# Patient Record
Sex: FEMALE | Race: BLACK OR AFRICAN AMERICAN | NOT HISPANIC OR LATINO
[De-identification: names, ages, dates, MRNs, and addresses within clinical notes are randomized per-mention and may not be internally consistent; named-entity substitution may affect disease eponyms.]

---

## 2024-03-06 ENCOUNTER — NON-APPOINTMENT (OUTPATIENT)
Age: 29
End: 2024-03-06

## 2024-03-14 ENCOUNTER — APPOINTMENT (OUTPATIENT)
Dept: OTOLARYNGOLOGY | Facility: CLINIC | Age: 29
End: 2024-03-14
Payer: COMMERCIAL

## 2024-03-14 DIAGNOSIS — R04.0 EPISTAXIS: ICD-10-CM

## 2024-03-14 DIAGNOSIS — J02.9 ACUTE PHARYNGITIS, UNSPECIFIED: ICD-10-CM

## 2024-03-14 DIAGNOSIS — J30.2 OTHER SEASONAL ALLERGIC RHINITIS: ICD-10-CM

## 2024-03-14 PROBLEM — Z00.00 ENCOUNTER FOR PREVENTIVE HEALTH EXAMINATION: Status: ACTIVE | Noted: 2024-03-14

## 2024-03-14 PROCEDURE — 99204 OFFICE O/P NEW MOD 45 MIN: CPT | Mod: 25

## 2024-03-14 PROCEDURE — 31575 DIAGNOSTIC LARYNGOSCOPY: CPT

## 2024-03-14 RX ORDER — FAMOTIDINE 20 MG/1
20 TABLET, FILM COATED ORAL DAILY
Qty: 30 | Refills: 0 | Status: ACTIVE | COMMUNITY
Start: 2024-03-14 | End: 1900-01-01

## 2024-03-14 NOTE — HISTORY OF PRESENT ILLNESS
[de-identified] : CC: dysphonia   HISTORY OF PRESENT ILLNESS: Joleen Sanchez is a 27 y/o female who presents today with sore throat x 6-8 mo she is a . she has voice break, vocal fatigue she reports having strep 1 mo ago. denies fevers she drinks tea with honey which helps as well as halls and vicks denies trying any medications for her symptoms. denies dysphagia, difficulty breathing, GERD she also endorses seasonal allergies, epistaxis she denies seeing a voice therapist in the past  REVIEW OF SYSTEMS: General ROS: negative for - chills, fatigue or fever Psychological ROS: negative for - anxiety or depression Ophthalmic ROS: negative for - blurry vision, decreased vision or double vision ENT ROS: negative except as noted from HPI Allergy and Immunology ROS: negative except as noted from HPI Hematological and Lymphatic ROS: negative for - bleeding problems Endocrine ROS: negative for - malaise/lethargy Respiratory ROS: negative for - stridor Cardiovascular ROS: negative for - chest pain Gastrointestinal ROS: negative for - appetite loss or nausea/vomiting Genitourinary ROS: negative for - incontinence Musculoskeletal ROS: negative for - gait disturbance Neurological ROS: negative for - behavioral changes Dermatological ROS: negative for - nail changes   Physical Exam:   GENERAL APPEARANCE: Well-developed and No Acute Distress. COMMUNICATION: Able to Communicate. Strong Voice.   HEAD AND FACE Eyes: Testing of ocular motility including primary gaze alignment normal. Inspection and Appearance: No evidence of lesions or masses Palpation: Palpation of the face reveals no sinus tenderness Salivary Glands: Symmetric without masses Facial Strength: Symmetric without evidence of facial paralysis   EAR, NOSE, MOUTH, and THROAT: Ear Canals and Tympanic Membranes, Bilateral: No evidence of inflammation or lesions. Thresholds: Clinical speech reception thresholds normal. External, Nose and Auricle: No lesions or masses. Nasal, Mucosa, Septum, and Turbinates: See endoscopy.   NECK: Evaluation: No evidence of masses or crepitus. The neck is symmetric and the trachea is in the midline position. Thyroid: No evidence of enlargement, tenderness or mass. Neck Lymph Nodes: WNL. Respiratory: Inspection of the chest including symmetry, expansion and/or assessment of respiratory effort normal. Cardiovascular: Evaluation of peripheral vascular system by observation and palpation of capillary refill, normal. Neurological/Psychiatric: Alert, Oriented, Mood, and Affect Normal.   PROCEDURE: Flexible laryngoscopy with topical anesthesia (60962) ANESTHESIA: Topical with 4% Lidocaine  INDICATION FOR PROCEDURE: Unable to perform complete exam with mirror laryngoscopy  PREOPERATIVE DIAGNOSIS: dysphonia  POSTOPERATIVE DIAGNOSIS: same as above  SURGEON: Shamir Fuentes MD   Prior to the procedure, I had a discussion with the patient regarding the risks, benefits, and alternatives of the procedure and a verbal consent was obtained.   INSTRUMENTS: Flexible scope   OPERATIVE PROCEDURE NOTE:  Patient was taken to the endoscopy suite where the nose and the pharynx were anesthetized with topical Pontocaine in a spray form. After adequate anesthesia of the nasal cavity and the pharynx, the fiberoptic endoscope was inserted through the nasal cavity. The palate was identified for patency. The nasopharynx, oropharynx, hypopharynx and the larynx were examined, along with the base of tongue. Structural examination of vocal cord movement was carried out and the larynx was examined during  phonation and deglutition. Gestures, such as cough, laughing and respiration were also performed to look for laryngeal abnormalities.  EXAM FINDINGS:   The nasal cavity mucosa was pink and normal.  No nasal cavity lesions or masses were observed.  No signs of infection or pus.  The nasopharynx was clear. No mass or lesion visualized.  The tongue was surface was found to be normal appearing.  No lesions or masses seen in the pharyngeal, hypopharyngeal, or laryngeal airway.  Mucosa appeared normal.  The vocal cords were mobile bilaterally.  Airway intact.    IMPRESSION: Ms. Sanchez is a pleasant 28 year old lady with dysphonia possible vocal nodules   PLAN:  - refer to Dr. Coley for stroboscopy  - pepcid - vocal hygiene - saline sprays and increase hydration for nasal dryness - humidifier      Shamir Fuentes MD [facility] Rhinology and Skull Base Surgery Department of Otolaryngology- Head and Neck Surgery [facility]

## 2024-04-05 ENCOUNTER — TRANSCRIPTION ENCOUNTER (OUTPATIENT)
Age: 29
End: 2024-04-05

## 2024-04-05 ENCOUNTER — APPOINTMENT (OUTPATIENT)
Dept: OTOLARYNGOLOGY | Facility: CLINIC | Age: 29
End: 2024-04-05
Payer: COMMERCIAL

## 2024-04-05 VITALS
TEMPERATURE: 96.8 F | DIASTOLIC BLOOD PRESSURE: 88 MMHG | SYSTOLIC BLOOD PRESSURE: 132 MMHG | HEIGHT: 64 IN | WEIGHT: 243 LBS | OXYGEN SATURATION: 100 % | HEART RATE: 73 BPM | BODY MASS INDEX: 41.48 KG/M2

## 2024-04-05 DIAGNOSIS — Z78.9 OTHER SPECIFIED HEALTH STATUS: ICD-10-CM

## 2024-04-05 DIAGNOSIS — F17.200 NICOTINE DEPENDENCE, UNSPECIFIED, UNCOMPLICATED: ICD-10-CM

## 2024-04-05 DIAGNOSIS — Z83.3 FAMILY HISTORY OF DIABETES MELLITUS: ICD-10-CM

## 2024-04-05 DIAGNOSIS — K21.9 GASTRO-ESOPHAGEAL REFLUX DISEASE W/OUT ESOPHAGITIS: ICD-10-CM

## 2024-04-05 DIAGNOSIS — G47.33 OBSTRUCTIVE SLEEP APNEA (ADULT) (PEDIATRIC): ICD-10-CM

## 2024-04-05 DIAGNOSIS — J38.2 NODULES OF VOCAL CORDS: ICD-10-CM

## 2024-04-05 PROCEDURE — 70371 SPEECH EVALUATION COMPLEX: CPT | Mod: 59

## 2024-04-05 PROCEDURE — 31579 LARYNGOSCOPY TELESCOPIC: CPT

## 2024-04-05 PROCEDURE — 99205 OFFICE O/P NEW HI 60 MIN: CPT | Mod: 25

## 2024-04-05 RX ORDER — OMEPRAZOLE 40 MG/1
40 CAPSULE, DELAYED RELEASE ORAL
Qty: 30 | Refills: 2 | Status: ACTIVE | COMMUNITY
Start: 2024-04-05 | End: 1900-01-01

## 2024-04-08 NOTE — PROCEDURE
[de-identified] : - Procedure Note   Pre-operative Diagnosis: Dysphonia, Throat discomfort Post-operative Diagnosis:  laryngopharyngeal reflux, evidence of bilateral nodules and inflammation Anesthesia: Topical - 1 % Lidocaine/Phenylephrine Procedure:  Flexible Laryngoscopy with Stroboscopy - [facility] [place] [address]   Procedure Details:  The patient was placed in the sitting position.  After decongestant and anesthesia were applied the laryngoscope was passed.  The nasal cavities, nasopharynx, oropharynx, hypopharynx, and larynx were all examined.  Vocal folds were examined during respiration and phonation.  The following findings were noted:   Findings:  Nose: Septum is midline, turbinates are normal, nasal airways patent, mucosa normal Nasopharynx: Adenoids normal, no masses, eustachian tube normal Oropharynx: Pharyngeal walls symmetric and without lesion. Tonsils/fossae symmetric Hypopharynx: Hypopharynx and pyriform sinuses without lesion. No masses or asymmetry.  No pooling of secretions. Larynx:  Epiglottis and aryepiglottic folds were sharp and crisp bilaterally.  Bilateral false vocal folds normal appearance. Airway was widely patent.  + postcricoid edema,  erythema of the vocal process   Strobe Exam Ratings    TVF Appearance: normal,  mild erythema of the vocal process, + fullness and inflammation anterior one third bilaterally consistent with nodule TVF Mobility: normal Edema/hypertrophy: normal,  + postcricoid edema Mucus on TVF: normal Glottic Closure: normal/adequate Mucosal Wave: Slightly reduced Amplitude of Vibration: Slightly reduced Phase: symmetric Supraglottic Hyperfunction: none Other Findings: none   Condition: Stable.  Patient tolerated procedure well.   Complications: None   --------------------------------------------------------------------   Procedure: Pharyngeal and speech evaluation, by cine or video - CPT 95463  Voice assessment was recorded via video recording on this date.   Clarity: Reduced Range: Reduced Pitch Control: Reduced Projection: Reduced Tremor: None Cough: None   Condition: Stable.  Patient tolerated procedure well. Complications: None

## 2024-04-08 NOTE — HISTORY OF PRESENT ILLNESS
[de-identified] : 4/5/2024 28F referred by Dr. Fuentes for voice issues for the last year. 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 She would lose her voice for 1-2days at a time, occuring 1-2 times per month. Starting in September this got worse and starting happening more frequently. She also sings once a month at [employer] which she has done her whole life. Smokes hookah socially, once every few months. Drinks alcohol socially. +frequent throat clearing, globus sensation, throat tightness. Denies issues with breathing or swallowing. Does endorse snoring.   Diet:  +onions, garlic, tomato, citrus fruits, blueberries, mint, carbonated beverages, caffeine (occasional),  -chocolate Alcohol consumption: socially Late night eating: yes Hydration: 1 gallon per day

## 2024-04-08 NOTE — PHYSICAL EXAM
[Normal] : mucosa is normal [Midline] : trachea located in midline position [FreeTextEntry1] : Hoarse and raspy voice with decreased range, normal pitch control and projection.

## 2024-04-08 NOTE — ASSESSMENT
[FreeTextEntry1] : - 4/5/24  28F presenting with 1 year of dysphonia. Based on history and physical exam, I do believe there is a component of laryngopharyngeal reflux. At this time I am recommending dietary and behavioral change to reduce acid in the diet. I am also recommending omeprazole as well famotidine for a 3 months trial. I discussed that I do believe there is evidence of obstructive sleep apnea based on body habitus and redundant pharyngeal tissue, will recommend consultation with sleep medicine.. 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 Discussed with patient that I am recommending voice therapy to treat this and I will re-evaluate this in 2 months.      Plan: -Sleep study, sleep medicine referral - Dietary and behavioral modification to reduce acid reflux, handout given - Omeprazole qd as well as Famotidine qhs  - Voice hygiene, increase hydration, sips of water throughout the day, avoid throat clearing -Speech therapy -Follow up in 2-3 months, sooner if symptoms should worsen or not improve

## 2024-04-18 ENCOUNTER — APPOINTMENT (OUTPATIENT)
Dept: OTOLARYNGOLOGY | Facility: CLINIC | Age: 29
End: 2024-04-18

## 2024-04-22 ENCOUNTER — APPOINTMENT (OUTPATIENT)
Dept: SLEEP CENTER | Facility: HOSPITAL | Age: 29
End: 2024-04-22

## 2024-04-22 ENCOUNTER — OUTPATIENT (OUTPATIENT)
Dept: OUTPATIENT SERVICES | Facility: HOSPITAL | Age: 29
LOS: 1 days | End: 2024-04-22
Payer: COMMERCIAL

## 2024-04-22 DIAGNOSIS — G47.33 OBSTRUCTIVE SLEEP APNEA (ADULT) (PEDIATRIC): ICD-10-CM

## 2024-04-22 PROCEDURE — 95810 POLYSOM 6/> YRS 4/> PARAM: CPT

## 2024-04-22 PROCEDURE — 95810 POLYSOM 6/> YRS 4/> PARAM: CPT | Mod: 26

## 2024-06-28 ENCOUNTER — APPOINTMENT (OUTPATIENT)
Dept: OTOLARYNGOLOGY | Facility: CLINIC | Age: 29
End: 2024-06-28
Payer: COMMERCIAL

## 2024-06-28 PROCEDURE — 92524 BEHAVRAL QUALIT ANALYS VOICE: CPT | Mod: GN

## 2024-06-28 PROCEDURE — 31579 LARYNGOSCOPY TELESCOPIC: CPT

## 2024-06-28 PROCEDURE — 92520 LARYNGEAL FUNCTION STUDIES: CPT | Mod: 59,GN

## 2024-07-15 ENCOUNTER — APPOINTMENT (OUTPATIENT)
Dept: OTOLARYNGOLOGY | Facility: CLINIC | Age: 29
End: 2024-07-15

## 2024-07-22 ENCOUNTER — APPOINTMENT (OUTPATIENT)
Dept: OTOLARYNGOLOGY | Facility: CLINIC | Age: 29
End: 2024-07-22

## 2024-08-16 ENCOUNTER — APPOINTMENT (OUTPATIENT)
Dept: OTOLARYNGOLOGY | Facility: CLINIC | Age: 29
End: 2024-08-16

## 2024-08-30 ENCOUNTER — APPOINTMENT (OUTPATIENT)
Dept: OTOLARYNGOLOGY | Facility: CLINIC | Age: 29
End: 2024-08-30